# Patient Record
Sex: FEMALE | Race: BLACK OR AFRICAN AMERICAN | NOT HISPANIC OR LATINO | Employment: FULL TIME | URBAN - METROPOLITAN AREA
[De-identification: names, ages, dates, MRNs, and addresses within clinical notes are randomized per-mention and may not be internally consistent; named-entity substitution may affect disease eponyms.]

---

## 2017-09-18 PROBLEM — I51.7 LVH (LEFT VENTRICULAR HYPERTROPHY): Status: ACTIVE | Noted: 2017-09-18

## 2017-09-18 PROBLEM — G89.29 CHRONIC PAIN OF LEFT KNEE: Status: ACTIVE | Noted: 2017-09-18

## 2017-09-18 PROBLEM — M25.562 CHRONIC PAIN OF LEFT KNEE: Status: ACTIVE | Noted: 2017-09-18

## 2017-09-18 PROBLEM — I10 HTN, GOAL BELOW 140/80: Status: ACTIVE | Noted: 2017-09-18

## 2017-09-18 PROBLEM — I87.2 VENOUS STASIS DERMATITIS: Status: ACTIVE | Noted: 2017-09-18

## 2017-09-18 PROBLEM — Z12.39 BREAST CANCER SCREENING, HIGH RISK PATIENT: Status: ACTIVE | Noted: 2017-09-18

## 2017-09-18 PROBLEM — Z12.11 SCREENING FOR COLON CANCER: Status: ACTIVE | Noted: 2017-09-18

## 2017-11-01 PROBLEM — M25.562 ACUTE PAIN OF LEFT KNEE: Status: ACTIVE | Noted: 2017-11-01

## 2017-11-01 PROBLEM — E78.5 HYPERLIPIDEMIA LDL GOAL <130: Status: ACTIVE | Noted: 2017-11-01

## 2017-11-01 PROBLEM — E79.0 HYPERURICEMIA: Status: ACTIVE | Noted: 2017-11-01

## 2018-02-27 PROBLEM — M79.605 PAIN OF LEFT LOWER EXTREMITY: Status: ACTIVE | Noted: 2018-02-27

## 2018-02-27 PROBLEM — R35.0 INCREASED URINARY FREQUENCY: Status: ACTIVE | Noted: 2018-02-27

## 2018-02-27 PROBLEM — D70.9 NEUTROPENIA: Status: ACTIVE | Noted: 2018-02-27

## 2018-02-27 PROBLEM — I10 HTN, GOAL BELOW 140/90: Status: ACTIVE | Noted: 2018-02-27

## 2018-12-24 ENCOUNTER — HOSPITAL ENCOUNTER (OUTPATIENT)
Facility: HOSPITAL | Age: 53
Discharge: HOME | End: 2018-12-24
Attending: EMERGENCY MEDICINE | Admitting: EMERGENCY MEDICINE
Payer: COMMERCIAL

## 2018-12-24 VITALS
HEART RATE: 97 BPM | BODY MASS INDEX: 29.57 KG/M2 | HEIGHT: 68 IN | DIASTOLIC BLOOD PRESSURE: 98 MMHG | OXYGEN SATURATION: 95 % | RESPIRATION RATE: 19 BRPM | WEIGHT: 195.13 LBS | SYSTOLIC BLOOD PRESSURE: 170 MMHG | TEMPERATURE: 98 F

## 2018-12-24 DIAGNOSIS — J18.9 PNEUMONIA OF RIGHT UPPER LOBE DUE TO INFECTIOUS ORGANISM: ICD-10-CM

## 2018-12-24 DIAGNOSIS — I10 UNCONTROLLED HYPERTENSION: ICD-10-CM

## 2018-12-24 DIAGNOSIS — I10 HTN, GOAL BELOW 140/80: ICD-10-CM

## 2018-12-24 DIAGNOSIS — R06.02 SOB (SHORTNESS OF BREATH): ICD-10-CM

## 2018-12-24 DIAGNOSIS — J18.9 PNEUMONIA DUE TO INFECTIOUS ORGANISM, UNSPECIFIED LATERALITY, UNSPECIFIED PART OF LUNG: Primary | ICD-10-CM

## 2018-12-24 DIAGNOSIS — R79.89 ELEVATED TROPONIN: ICD-10-CM

## 2018-12-24 LAB
ALBUMIN SERPL BCP-MCNC: 4.3 G/DL
ALP SERPL-CCNC: 100 U/L
ALT SERPL W/O P-5'-P-CCNC: 28 U/L
ANION GAP SERPL CALC-SCNC: 10 MMOL/L
AST SERPL-CCNC: 22 U/L
BASOPHILS # BLD AUTO: 0.01 K/UL
BASOPHILS NFR BLD: 0.3 %
BILIRUB SERPL-MCNC: 0.8 MG/DL
BNP SERPL-MCNC: 80 PG/ML
BUN SERPL-MCNC: 14 MG/DL
CALCIUM SERPL-MCNC: 9.3 MG/DL
CHLORIDE SERPL-SCNC: 101 MMOL/L
CHOLEST SERPL-MCNC: 197 MG/DL
CHOLEST/HDLC SERPL: 4.5 {RATIO}
CO2 SERPL-SCNC: 26 MMOL/L
CREAT SERPL-MCNC: 0.9 MG/DL
DIFFERENTIAL METHOD: ABNORMAL
EOSINOPHIL # BLD AUTO: 0 K/UL
EOSINOPHIL NFR BLD: 0.9 %
ERYTHROCYTE [DISTWIDTH] IN BLOOD BY AUTOMATED COUNT: 12.8 %
EST. GFR  (AFRICAN AMERICAN): >60 ML/MIN/1.73 M^2
EST. GFR  (NON AFRICAN AMERICAN): >60 ML/MIN/1.73 M^2
GLUCOSE SERPL-MCNC: 108 MG/DL
HCT VFR BLD AUTO: 41.3 %
HDLC SERPL-MCNC: 44 MG/DL
HDLC SERPL: 22.3 %
HGB BLD-MCNC: 14 G/DL
LACTATE SERPL-SCNC: 0.8 MMOL/L
LDLC SERPL CALC-MCNC: 141 MG/DL
LYMPHOCYTES # BLD AUTO: 1.2 K/UL
LYMPHOCYTES NFR BLD: 37.4 %
MAGNESIUM SERPL-MCNC: 2.4 MG/DL
MCH RBC QN AUTO: 29.9 PG
MCHC RBC AUTO-ENTMCNC: 33.9 G/DL
MCV RBC AUTO: 88 FL
MONOCYTES # BLD AUTO: 0.3 K/UL
MONOCYTES NFR BLD: 7.9 %
NEUTROPHILS # BLD AUTO: 1.7 K/UL
NEUTROPHILS NFR BLD: 53.5 %
NONHDLC SERPL-MCNC: 153 MG/DL
PLATELET # BLD AUTO: 171 K/UL
PMV BLD AUTO: 11.4 FL
POTASSIUM SERPL-SCNC: 3.3 MMOL/L
PROT SERPL-MCNC: 8.4 G/DL
RBC # BLD AUTO: 4.68 M/UL
SODIUM SERPL-SCNC: 137 MMOL/L
TRIGL SERPL-MCNC: 60 MG/DL
TROPONIN I SERPL DL<=0.01 NG/ML-MCNC: 0.04 NG/ML
TROPONIN I SERPL DL<=0.01 NG/ML-MCNC: 0.05 NG/ML
TROPONIN I SERPL DL<=0.01 NG/ML-MCNC: 0.07 NG/ML
WBC # BLD AUTO: 3.18 K/UL

## 2018-12-24 PROCEDURE — 84484 ASSAY OF TROPONIN QUANT: CPT | Mod: 91

## 2018-12-24 PROCEDURE — G0378 HOSPITAL OBSERVATION PER HR: HCPCS

## 2018-12-24 PROCEDURE — 80061 LIPID PANEL: CPT

## 2018-12-24 PROCEDURE — 80053 COMPREHEN METABOLIC PANEL: CPT

## 2018-12-24 PROCEDURE — 94761 N-INVAS EAR/PLS OXIMETRY MLT: CPT

## 2018-12-24 PROCEDURE — 83605 ASSAY OF LACTIC ACID: CPT

## 2018-12-24 PROCEDURE — 87040 BLOOD CULTURE FOR BACTERIA: CPT

## 2018-12-24 PROCEDURE — 99285 EMERGENCY DEPT VISIT HI MDM: CPT | Mod: 25

## 2018-12-24 PROCEDURE — 83880 ASSAY OF NATRIURETIC PEPTIDE: CPT

## 2018-12-24 PROCEDURE — 83735 ASSAY OF MAGNESIUM: CPT

## 2018-12-24 PROCEDURE — 63600175 PHARM REV CODE 636 W HCPCS: Performed by: EMERGENCY MEDICINE

## 2018-12-24 PROCEDURE — 25000242 PHARM REV CODE 250 ALT 637 W/ HCPCS: Performed by: NURSE PRACTITIONER

## 2018-12-24 PROCEDURE — 94640 AIRWAY INHALATION TREATMENT: CPT

## 2018-12-24 PROCEDURE — 25000003 PHARM REV CODE 250: Performed by: NURSE PRACTITIONER

## 2018-12-24 PROCEDURE — 93010 ELECTROCARDIOGRAM REPORT: CPT | Mod: ,,, | Performed by: INTERNAL MEDICINE

## 2018-12-24 PROCEDURE — 25000242 PHARM REV CODE 250 ALT 637 W/ HCPCS: Performed by: EMERGENCY MEDICINE

## 2018-12-24 PROCEDURE — 96361 HYDRATE IV INFUSION ADD-ON: CPT

## 2018-12-24 PROCEDURE — 25000003 PHARM REV CODE 250: Performed by: EMERGENCY MEDICINE

## 2018-12-24 PROCEDURE — 93005 ELECTROCARDIOGRAM TRACING: CPT

## 2018-12-24 PROCEDURE — 85025 COMPLETE CBC W/AUTO DIFF WBC: CPT

## 2018-12-24 PROCEDURE — 96374 THER/PROPH/DIAG INJ IV PUSH: CPT

## 2018-12-24 PROCEDURE — 36415 COLL VENOUS BLD VENIPUNCTURE: CPT

## 2018-12-24 RX ORDER — OLMESARTAN MEDOXOMIL 40 MG/1
40 TABLET ORAL DAILY
Qty: 90 TABLET | Refills: 1 | Status: SHIPPED | OUTPATIENT
Start: 2018-12-25 | End: 2019-12-25

## 2018-12-24 RX ORDER — AMLODIPINE BESYLATE 10 MG/1
10 TABLET ORAL DAILY
Qty: 30 TABLET | Refills: 1 | Status: SHIPPED | OUTPATIENT
Start: 2018-12-25 | End: 2019-12-25

## 2018-12-24 RX ORDER — OLMESARTAN MEDOXOMIL 20 MG/1
40 TABLET ORAL DAILY
Status: DISCONTINUED | OUTPATIENT
Start: 2018-12-24 | End: 2018-12-25 | Stop reason: HOSPADM

## 2018-12-24 RX ORDER — POTASSIUM CHLORIDE 20 MEQ/1
40 TABLET, EXTENDED RELEASE ORAL ONCE
Status: COMPLETED | OUTPATIENT
Start: 2018-12-24 | End: 2018-12-24

## 2018-12-24 RX ORDER — NAPROXEN SODIUM 220 MG/1
162 TABLET, FILM COATED ORAL
Status: COMPLETED | OUTPATIENT
Start: 2018-12-24 | End: 2018-12-24

## 2018-12-24 RX ORDER — HYDROCHLOROTHIAZIDE 25 MG/1
25 TABLET ORAL DAILY
Qty: 30 TABLET | Refills: 1 | Status: SHIPPED | OUTPATIENT
Start: 2018-12-25 | End: 2019-12-25

## 2018-12-24 RX ORDER — HYDROCHLOROTHIAZIDE 25 MG/1
25 TABLET ORAL DAILY
Status: DISCONTINUED | OUTPATIENT
Start: 2018-12-24 | End: 2018-12-25 | Stop reason: HOSPADM

## 2018-12-24 RX ORDER — AZITHROMYCIN 250 MG/1
500 TABLET, FILM COATED ORAL
Status: COMPLETED | OUTPATIENT
Start: 2018-12-24 | End: 2018-12-24

## 2018-12-24 RX ORDER — AZITHROMYCIN 250 MG/1
250 TABLET, FILM COATED ORAL DAILY
Status: DISCONTINUED | OUTPATIENT
Start: 2018-12-25 | End: 2018-12-25 | Stop reason: HOSPADM

## 2018-12-24 RX ORDER — AZITHROMYCIN 250 MG/1
250 TABLET, FILM COATED ORAL DAILY
Qty: 4 TABLET | Refills: 0 | Status: SHIPPED | OUTPATIENT
Start: 2018-12-25 | End: 2018-12-29

## 2018-12-24 RX ORDER — HYDRALAZINE HYDROCHLORIDE 25 MG/1
25 TABLET, FILM COATED ORAL EVERY 8 HOURS PRN
Status: DISCONTINUED | OUTPATIENT
Start: 2018-12-24 | End: 2018-12-25 | Stop reason: HOSPADM

## 2018-12-24 RX ORDER — PREDNISONE 20 MG/1
40 TABLET ORAL DAILY
Qty: 10 TABLET | Refills: 0 | Status: SHIPPED | OUTPATIENT
Start: 2018-12-25 | End: 2018-12-30

## 2018-12-24 RX ORDER — AMLODIPINE BESYLATE 5 MG/1
10 TABLET ORAL DAILY
Status: DISCONTINUED | OUTPATIENT
Start: 2018-12-24 | End: 2018-12-25 | Stop reason: HOSPADM

## 2018-12-24 RX ORDER — IPRATROPIUM BROMIDE AND ALBUTEROL SULFATE 2.5; .5 MG/3ML; MG/3ML
3 SOLUTION RESPIRATORY (INHALATION)
Status: DISCONTINUED | OUTPATIENT
Start: 2018-12-24 | End: 2018-12-24

## 2018-12-24 RX ORDER — IPRATROPIUM BROMIDE AND ALBUTEROL SULFATE 2.5; .5 MG/3ML; MG/3ML
3 SOLUTION RESPIRATORY (INHALATION)
Status: DISCONTINUED | OUTPATIENT
Start: 2018-12-24 | End: 2018-12-25 | Stop reason: HOSPADM

## 2018-12-24 RX ORDER — PREDNISONE 20 MG/1
40 TABLET ORAL DAILY
Status: DISCONTINUED | OUTPATIENT
Start: 2018-12-25 | End: 2018-12-25 | Stop reason: HOSPADM

## 2018-12-24 RX ORDER — IPRATROPIUM BROMIDE AND ALBUTEROL SULFATE 2.5; .5 MG/3ML; MG/3ML
3 SOLUTION RESPIRATORY (INHALATION)
Status: COMPLETED | OUTPATIENT
Start: 2018-12-24 | End: 2018-12-24

## 2018-12-24 RX ADMIN — CEFTRIAXONE 2 G: 2 INJECTION, SOLUTION INTRAVENOUS at 12:12

## 2018-12-24 RX ADMIN — SODIUM CHLORIDE 3000 ML: 0.9 INJECTION, SOLUTION INTRAVENOUS at 12:12

## 2018-12-24 RX ADMIN — HYDROCHLOROTHIAZIDE 25 MG: 25 TABLET ORAL at 01:12

## 2018-12-24 RX ADMIN — AMLODIPINE BESYLATE 10 MG: 5 TABLET ORAL at 01:12

## 2018-12-24 RX ADMIN — OLMESARTAN MEDOXOMIL 40 MG: 20 TABLET, FILM COATED ORAL at 01:12

## 2018-12-24 RX ADMIN — IPRATROPIUM BROMIDE AND ALBUTEROL SULFATE 3 ML: .5; 3 SOLUTION RESPIRATORY (INHALATION) at 11:12

## 2018-12-24 RX ADMIN — IPRATROPIUM BROMIDE AND ALBUTEROL SULFATE 3 ML: .5; 3 SOLUTION RESPIRATORY (INHALATION) at 04:12

## 2018-12-24 RX ADMIN — ASPIRIN 81 MG 162 MG: 81 TABLET ORAL at 12:12

## 2018-12-24 RX ADMIN — AZITHROMYCIN 500 MG: 250 TABLET, FILM COATED ORAL at 12:12

## 2018-12-24 RX ADMIN — POTASSIUM CHLORIDE 40 MEQ: 1500 TABLET, EXTENDED RELEASE ORAL at 12:12

## 2018-12-24 RX ADMIN — IPRATROPIUM BROMIDE AND ALBUTEROL SULFATE 3 ML: .5; 3 SOLUTION RESPIRATORY (INHALATION) at 12:12

## 2018-12-24 NOTE — ASSESSMENT & PLAN NOTE
Continue home olmesartan/norvasc. Increase HCTZ 25 mg. Discussed with patient stop using NSAID and low salt diet. Avoid BB for now. Add hydralazine PRN.

## 2018-12-24 NOTE — PROGRESS NOTES
TN taught Symptoms and Problems for Cellulitis home care with pt and  with teach back:  1. ***, 2.***n. TN placed education sheet in [employer] Packet..     Help at home will be from Miranda bonilla, assisting in pt's recovery.     TN taught patient about things ***is responsible for when discharged:  Particularly on how to Manage his Care At Home:  1. Getting his prescriptions filled.  2. Taking his medications as directed. DO NOT MISS ANY DOSES!  3. Going to his follow-up doctor appointments.   .  TN checked whiteboard for cm/sw name, spectra link #, pt contact, and d/c disposition....Samira Marquez, RN, BSN, STN CCM

## 2018-12-24 NOTE — HPI
"Mrs. Magallanes is a 54 yo AAF with significant history uncontrolled hypertension with home  who was sent from [facility] on [facility] to hospital for evaluation of pneumonia found on chest x ray. Chest x ray reports "there is airspace disease in the lungs including right upper and left lower lungs. Small underlying lung nodules are not excluded." Reports sore throat last Monday or Tuesday then productive cough with yellow sputum and shorntess of breath stated last Wed or so. Started wheezing yesterday. Activity and laying flat worsen symptoms. Denies fever but reports chills started yesterday.  also sick with URI symptoms. Denies smoking or illicit drug use. Reports diagnosed with asthma 4 years ago and had an inhaler but have not used inhaler in a long time. Use Advil very often for headaches and generalized aches and pain.     No EKG in epic (will look in folder), Troponin 0.066.   "

## 2018-12-24 NOTE — ASSESSMENT & PLAN NOTE
Likely ischemic demand from Pneumonia and uncontrolled HTN. No previous 2 D echo. Trend troponin and 2 D echo. Obtain lipid panel.  Continue ASA 81mg

## 2018-12-24 NOTE — ASSESSMENT & PLAN NOTE
" Chest x ray reports "there is airspace disease in the lungs including right upper and left lower lungs. Small underlying lung nodules are not excluded."   Received duoneb x 1 and solumedrom 125 mg x 1 at [facility] prior to admission  In ED, received rocephin IV and azithromycin 500 mg PO  Continue azithromycin PO and duoneb q 4 hrs. Continue steroid. Will need repeat CXR in 4-6 weeks.   "

## 2018-12-24 NOTE — ED PROVIDER NOTES
Encounter Date: 12/24/2018    SCRIBE #1 NOTE: I, Re Solomon, am scribing for, and in the presence of,  Arya Bray MD. I have scribed the following portions of the note - Other sections scribed: HPI, ROS, PE.       History     Chief Complaint   Patient presents with    Shortness of Breath     reports being sent from urgent care that started last week, but worsened last night.      CC: Wheezing    HPI: This is a 53 y.o. F who has HTN who presents to the ED for emergent evaluation of acute wheezing with associated cough that began last night. Pt states that wheezing and cough is exacerbated with lying flat. She was unable to lie flat last night and states that she slept sitting up. Pt reports a previous episode of wheezing, in which she was treated with breathing treatment years ago. Pt was evaluated and treated at Urgent Care for current episode of wheezing and coughing today. She was administered steroids and a breathing treatment at that visit with improvement. Pt was instructed to report to the ED from Urgent Care due to being diagnosed with Pneumonia. She states that she was also informed that she has an enlarged heart at that visit. Pt denies fever, chills, CP, night sweats, weight loss, leg swelling, abdominal pain, nausea, vomiting, diarrhea, difficulty urinating, constipation, Hx of CHF, tobacco use, or recreational drug use.        The history is provided by the patient. No  was used.     Review of patient's allergies indicates:  No Known Allergies  Past Medical History:   Diagnosis Date    Hypertension      Past Surgical History:   Procedure Laterality Date    APPENDECTOMY      HYSTERECTOMY       History reviewed. No pertinent family history.  Social History     Tobacco Use    Smoking status: Never Smoker   Substance Use Topics    Alcohol use: Yes    Drug use: No     Review of Systems   Constitutional: Negative for chills, diaphoresis, fever and unexpected weight change.    HENT: Negative for ear pain and sore throat.    Eyes: Negative for pain.   Respiratory: Positive for cough and wheezing. Negative for shortness of breath.    Cardiovascular: Negative for chest pain and leg swelling.   Gastrointestinal: Negative for abdominal pain, constipation, diarrhea, nausea and vomiting.   Genitourinary: Negative for difficulty urinating and dysuria.   Musculoskeletal: Negative for back pain.   Skin: Negative for rash.   Neurological: Negative for headaches.       Physical Exam     Initial Vitals [12/24/18 0943]   BP Pulse Resp Temp SpO2   (!) 177/111 97 18 98.9 °F (37.2 °C) 97 %      MAP       --         Physical Exam    Nursing note and vitals reviewed.    GENERAL: well developed, well nourished, lying comfortably, no acute distress, non toxic appearing  SKIN: warm, moist, no rash, no ecchymosis  HEAD: normocephalic, atraumtic  EYES: conjunctiva w/o pallor, sclera anicteric & w/o injection, PERRL, EOMI  MOUTH: patent, moist mucous membranes.  NECK: supple, activeROM w/o pain or stiffness, no JVD, no LAD  RESPIRATORY: effort nonlabored, no accessory muscule use or retractions. no rales/rhonci/. Bilateral wheezes at the lower bases, left greater than right. no chest wall TTP.  CARDIOVASCULAR: RRR, S1/S2, no murmur, gallop or rub. radial pulses 2+ symmetric bilaterally  ABDOMEN: soft, non tender, non distended.  MUSCULOSKELETAL: good activeROM of all extremities. no lower extremity edema or cyanosis  NEURO: A&Ox4, normal speech, normal gait, CNII-XII intact.       ED Course   Procedures  Labs Reviewed   CBC W/ AUTO DIFFERENTIAL - Abnormal; Notable for the following components:       Result Value    WBC 3.18 (*)     Gran # (ANC) 1.7 (*)     All other components within normal limits   COMPREHENSIVE METABOLIC PANEL - Abnormal; Notable for the following components:    Potassium 3.3 (*)     All other components within normal limits   TROPONIN I - Abnormal; Notable for the following components:     Troponin I 0.066 (*)     All other components within normal limits   CULTURE, BLOOD   CULTURE, BLOOD   B-TYPE NATRIURETIC PEPTIDE   MAGNESIUM   LACTIC ACID, PLASMA     EKG Readings: (Independently Interpreted)   EKG done at 9:47 a.m. showing normal sinus rhythm the rate of 97.  Biatrial enlargement LVH.  Flat T-waves diffusely.  No ST elevation.  Normal axis.  Nonspecific EKG.       Imaging Results    None          Medical Decision Making:   Initial Assessment:   Patient presenting today secondary to shortness of breath.  Patient has wheezing bilaterally which is consistent with a the reactive airways disease.  Remote history of asthma.  Given breathing treatments here.  Chest x-ray was done the outside facility which concerning for pneumonia.  I did review the chest x-ray and it does not a developing pneumonia.  Patient does not appear to be septic.  White count is at her baseline.  Did add on a 30 milliliter/kilogram bolus, lactic acid, abx, and blood cultures for completeness.  Additionally her labs were notable for an elevated troponin which likely a stress response from having a pneumonia.  However will trend the troponins and patient will be admitted to the hospital service for further management.  Port score 43    Also considered but less likely:     PE: normal rate, no ecent immobilization/surgery/travel/family history.  Other diagnosis more likely  Tamponade: unlikely due to chest xray and ekg  STEMI: No STEMI on ekg  Dissection: equal pulses bilaterally and no ripping chest pain to the back  Esophageal rupture: no dysphagia or vomiting and chest xray negative for mediastinal air  Arrhythmia: no arrhythmia on ekg  CHF: no fluid overload on Cxr and physical exam  Pneumothorax: bilateral breath sounds and no signs of pneumothorax on chest xray     Patient is agreeable for admission.  Admitted to the hospitalist service.  Basic admission orders as a courtesy were done.    Clinical Tests:   Lab Tests: Ordered  and Reviewed  Radiological Study: Ordered and Reviewed  Medical Tests: Ordered and Reviewed  ED Management:                Scribe Attestation:   Scribe #1: I performed the above scribed service and the documentation accurately describes the services I performed. I attest to the accuracy of the note.    Attending Attestation:           Physician Attestation for Scribe:  Physician Attestation Statement for Scribe #1: I, Arya Bray MD, reviewed documentation, as scribed by Re Solomon in my presence, and it is both accurate and complete.                    Clinical Impression:   The primary encounter diagnosis was Pneumonia due to infectious organism, unspecified laterality, unspecified part of lung. Diagnoses of SOB (shortness of breath) and Elevated troponin were also pertinent to this visit.                             Arya Bray MD  12/24/18 5103

## 2018-12-24 NOTE — H&P
"[facility] Hospital Medicine  History & Physical    Patient Name: Atiya Magallanes  MRN: [id number]  Admission Date: 12/24/2018  Attending Physician: Li Benavidez MD   Primary Care Provider: Muriel Murray MD         Patient information was obtained from patient and ER records.     Subjective:     Principal Problem:Pneumonia    Chief Complaint:   Chief Complaint   Patient presents with    Shortness of Breath     reports being sent from urgent care that started last week, but worsened last night.         HPI: Mrs. Magallanes is a 52 yo AAF with significant history uncontrolled hypertension with home  who was sent from [facility] to hospital for evaluation of pneumonia found on chest x ray. Chest x ray reports "there is airspace disease in the lungs including right upper and left lower lungs. Small underlying lung nodules are not excluded." Reports sore throat last Monday or Tuesday then productive cough with yellow sputum and shorntess of breath stated last Wed or so. Started wheezing yesterday. Activity and laying flat worsen symptoms. Denies fever but reports chills started yesterday.  also sick with URI symptoms. Denies smoking or illicit drug use. Reports diagnosed with asthma 4 years ago and had an inhaler but have not used inhaler in a long time. Use Advil very often for headaches and generalized aches and pain.     No EKG in epic (will look in folder), Troponin 0.066.     Past Medical History:   Diagnosis Date    Hypertension        Past Surgical History:   Procedure Laterality Date    APPENDECTOMY      HYSTERECTOMY         Review of patient's allergies indicates:  No Known Allergies    No current facility-administered medications on file prior to encounter.      Current Outpatient Medications on File Prior to Encounter   Medication Sig    olmesartan-amLODIPin-hcthiazid 40-10-12.5 mg Tab TAKE ONE TABLET BY MOUTH EVERY DAY    [DISCONTINUED] " allopurinol (ZYLOPRIM) 100 MG tablet TAKE 2 TABLETS(200 MG) BY MOUTH EVERY DAY    [DISCONTINUED] ibuprofen (ADVIL,MOTRIN) 100 MG tablet Take 200 mg by mouth every 6 (six) hours as needed for Temperature greater than.    comp stocking,knee,regular,sml (T.E.D. ANTI-EMBOLISM STOCKING) Misc 2 Pair by Misc.(Non-Drug; Combo Route) route once daily.     Family History     None        Tobacco Use    Smoking status: Never Smoker   Substance and Sexual Activity    Alcohol use: Yes    Drug use: No    Sexual activity: Not on file     Review of Systems   Constitutional: Positive for activity change and chills. Negative for fatigue and fever.   HENT: Negative.    Eyes: Negative.    Respiratory: Positive for shortness of breath and wheezing.    Cardiovascular: Negative.    Gastrointestinal: Negative.    Endocrine: Negative.    Genitourinary: Negative.    Musculoskeletal: Positive for arthralgias.   Allergic/Immunologic: Negative.    Neurological: Negative.    Hematological: Negative.    Psychiatric/Behavioral: Negative.      Objective:     Vital Signs (Most Recent):  Temp: 97.6 °F (36.4 °C) (12/24/18 1227)  Pulse: (!) 111 (12/24/18 1236)  Resp: 18 (12/24/18 1236)  BP: (!) 172/98 (12/24/18 1236)  SpO2: (!) 93 % (12/24/18 1236) Vital Signs (24h Range):  Temp:  [97.6 °F (36.4 °C)-98.9 °F (37.2 °C)] 97.6 °F (36.4 °C)  Pulse:  [] 111  Resp:  [18-20] 18  SpO2:  [93 %-97 %] 93 %  BP: (170-182)/() 172/98     Weight: 90.7 kg (200 lb)  Body mass index is 30.41 kg/m².    Physical Exam   Constitutional: She is oriented to person, place, and time. She appears well-developed and well-nourished.   Eyes: EOM are normal.   Neck: Neck supple.   Cardiovascular: Normal rate.   Murmur heard.  Pulmonary/Chest: Effort normal and breath sounds normal. No respiratory distress.   Abdominal: Soft. Bowel sounds are normal. She exhibits no distension.   Musculoskeletal: Normal range of motion.   Neurological: She is alert and oriented to  "person, place, and time.   Skin: Skin is warm and dry.   Psychiatric: She has a normal mood and affect.         CRANIAL NERVES     CN III, IV, VI   Extraocular motions are normal.        Significant Labs: All pertinent labs within the past 24 hours have been reviewed.    Significant Imaging: I have reviewed and interpreted all pertinent imaging results/findings within the past 24 hours.    Assessment/Plan:     * Pneumonia     Chest x ray reports "there is airspace disease in the lungs including right upper and left lower lungs. Small underlying lung nodules are not excluded."   Received duoneb x 1 and solumedrom 125 mg x 1 at [facility] prior to admission  In ED, received rocephin IV and azithromycin 500 mg PO  Continue azithromycin PO and duoneb q 4 hrs. Continue steroid. Will need repeat CXR in 4-6 weeks.      Elevated troponin    Likely ischemic demand from Pneumonia and uncontrolled HTN. No previous 2 D echo. Trend troponin and 2 D echo. Obtain lipid panel.  Continue ASA 81mg       Uncontrolled hypertension    Continue home olmesartan/norvasc. Increase HCTZ 25 mg. Discussed with patient stop using NSAID and low salt diet. Avoid BB for now. Add hydralazine PRN.          VTE Risk Mitigation (From admission, onward)        Ordered     IP VTE HIGH RISK PATIENT  Once      12/24/18 1310     Place sequential compression device  Until discontinued      12/24/18 1310     Place CHELA hose  Until discontinued      12/24/18 1310             Shauna Patrick NP  Department of Hospital Medicine   [facility]  "

## 2018-12-24 NOTE — NURSING
Pt arrive to the unit by wheelchair accompanied by ED staff.  Assisted pt to bed. Tele monitoring initiated.  Admit assessment initiated.  Pt is AAOx4.  No distress noted.

## 2018-12-24 NOTE — LETTER
December 24, 2018         [place] [address]  Phone: [phone]  Fax: [phone]       Patient: Atiya Magallanes   YOB: 1965  Date of Visit: 12/24/2018    To Whom It May Concern:    Margaret Magallanes  was at [facility] on 12/24/2018. She may return to work/school on 1/31/2018 no restrictions. If you have any questions or concerns, or if I can be of further assistance, please do not hesitate to contact me.    Sincerely,      Bonilla Elizondo PA-C

## 2018-12-24 NOTE — SUBJECTIVE & OBJECTIVE
Past Medical History:   Diagnosis Date    Hypertension        Past Surgical History:   Procedure Laterality Date    APPENDECTOMY      HYSTERECTOMY         Review of patient's allergies indicates:  No Known Allergies    No current facility-administered medications on file prior to encounter.      Current Outpatient Medications on File Prior to Encounter   Medication Sig    olmesartan-amLODIPin-hcthiazid 40-10-12.5 mg Tab TAKE ONE TABLET BY MOUTH EVERY DAY    [DISCONTINUED] allopurinol (ZYLOPRIM) 100 MG tablet TAKE 2 TABLETS(200 MG) BY MOUTH EVERY DAY    [DISCONTINUED] ibuprofen (ADVIL,MOTRIN) 100 MG tablet Take 200 mg by mouth every 6 (six) hours as needed for Temperature greater than.    comp stocking,knee,regular,sml (T.E.D. ANTI-EMBOLISM STOCKING) Misc 2 Pair by Misc.(Non-Drug; Combo Route) route once daily.     Family History     None        Tobacco Use    Smoking status: Never Smoker   Substance and Sexual Activity    Alcohol use: Yes    Drug use: No    Sexual activity: Not on file     Review of Systems   Constitutional: Positive for activity change and chills. Negative for fatigue and fever.   HENT: Negative.    Eyes: Negative.    Respiratory: Positive for shortness of breath and wheezing.    Cardiovascular: Negative.    Gastrointestinal: Negative.    Endocrine: Negative.    Genitourinary: Negative.    Musculoskeletal: Positive for arthralgias.   Allergic/Immunologic: Negative.    Neurological: Negative.    Hematological: Negative.    Psychiatric/Behavioral: Negative.      Objective:     Vital Signs (Most Recent):  Temp: 97.6 °F (36.4 °C) (12/24/18 1227)  Pulse: (!) 111 (12/24/18 1236)  Resp: 18 (12/24/18 1236)  BP: (!) 172/98 (12/24/18 1236)  SpO2: (!) 93 % (12/24/18 1236) Vital Signs (24h Range):  Temp:  [97.6 °F (36.4 °C)-98.9 °F (37.2 °C)] 97.6 °F (36.4 °C)  Pulse:  [] 111  Resp:  [18-20] 18  SpO2:  [93 %-97 %] 93 %  BP: (170-182)/() 172/98     Weight: 90.7 kg (200 lb)  Body mass  index is 30.41 kg/m².    Physical Exam   Constitutional: She is oriented to person, place, and time. She appears well-developed and well-nourished.   Eyes: EOM are normal.   Neck: Neck supple.   Cardiovascular: Normal rate.   Murmur heard.  Pulmonary/Chest: Effort normal and breath sounds normal. No respiratory distress.   Abdominal: Soft. Bowel sounds are normal. She exhibits no distension.   Musculoskeletal: Normal range of motion.   Neurological: She is alert and oriented to person, place, and time.   Skin: Skin is warm and dry.   Psychiatric: She has a normal mood and affect.         CRANIAL NERVES     CN III, IV, VI   Extraocular motions are normal.        Significant Labs: All pertinent labs within the past 24 hours have been reviewed.    Significant Imaging: I have reviewed and interpreted all pertinent imaging results/findings within the past 24 hours.

## 2018-12-25 LAB
AORTIC ROOT ANNULUS: 3.04 CM
AORTIC VALVE CUSP SEPERATION: 2.22 CM
ASCENDING AORTA: 2.74 CM
AV INDEX (PROSTH): 0.66
AV MEAN GRADIENT: 10.78 MMHG
AV PEAK GRADIENT: 17.81 MMHG
AV VALVE AREA: 2.34 CM2
BSA FOR ECHO PROCEDURE: 2.06 M2
CV ECHO LV RWT: 0.62 CM
DOP CALC AO PEAK VEL: 2.11 M/S
DOP CALC AO VTI: 37.51 CM
DOP CALC LVOT AREA: 3.53 CM2
DOP CALC LVOT DIAMETER: 2.12 CM
DOP CALC LVOT STROKE VOLUME: 87.74 CM3
DOP CALCLVOT PEAK VEL VTI: 24.87 CM
E WAVE DECELERATION TIME: 135.97 MSEC
E/A RATIO: 0.72
E/E' RATIO: 15
ECHO LV POSTERIOR WALL: 1.39 CM (ref 0.6–1.1)
FRACTIONAL SHORTENING: 34 % (ref 28–44)
INTERVENTRICULAR SEPTUM: 1.51 CM (ref 0.6–1.1)
IVRT: 0.06 MSEC
LA MAJOR: 6.19 CM
LA MINOR: 5.81 CM
LA WIDTH: 4.81 CM
LEFT ATRIUM SIZE: 4.83 CM
LEFT ATRIUM VOLUME INDEX: 58.5 ML/M2
LEFT ATRIUM VOLUME: 118.37 CM3
LEFT INTERNAL DIMENSION IN SYSTOLE: 2.97 CM (ref 2.1–4)
LEFT VENTRICLE DIASTOLIC VOLUME INDEX: 45.96 ML/M2
LEFT VENTRICLE DIASTOLIC VOLUME: 92.96 ML
LEFT VENTRICLE MASS INDEX: 129.9 G/M2
LEFT VENTRICLE SYSTOLIC VOLUME INDEX: 16.9 ML/M2
LEFT VENTRICLE SYSTOLIC VOLUME: 34.27 ML
LEFT VENTRICULAR INTERNAL DIMENSION IN DIASTOLE: 4.51 CM (ref 3.5–6)
LEFT VENTRICULAR MASS: 262.79 G
LV LATERAL E/E' RATIO: 12.5
LV SEPTAL E/E' RATIO: 18.75
MV PEAK A VEL: 1.04 M/S
MV PEAK E VEL: 0.75 M/S
PISA TR MAX VEL: 2.87 M/S
PULM VEIN S/D RATIO: 1.11
PV PEAK D VEL: 0.44 M/S
PV PEAK S VEL: 0.49 M/S
PV PEAK VELOCITY: 1.55 CM/S
RA MAJOR: 5.57 CM
RA PRESSURE: 3 MMHG
RA WIDTH: 4.67 CM
RIGHT VENTRICULAR END-DIASTOLIC DIMENSION: 3.22 CM
RV TISSUE DOPPLER FREE WALL SYSTOLIC VELOCITY 1 (APICAL 4 CHAMBER VIEW): 13.62 M/S
SINUS: 2.85 CM
STJ: 2.26 CM
TDI LATERAL: 0.06
TDI SEPTAL: 0.04
TDI: 0.05
TR MAX PG: 32.95 MMHG
TRICUSPID ANNULAR PLANE SYSTOLIC EXCURSION: 2.38 CM
TV REST PULMONARY ARTERY PRESSURE: 36 MMHG

## 2018-12-25 NOTE — NURSING
Pt discharged. PIV removed catheter intact and tele monitor discontinued. No swelling or drainage at iv insertion site. Discharge orders reviewed with pt  and pt verbalized understanding. Copy placed in blue folder and blue folder given to pt. Pt's belongings removed from room and given to pt. Pt appears to be in no apparent distress and denies any discomfort. Pt assisted off unit via wheelchair by transport.

## 2018-12-25 NOTE — NURSING
Discharge orders received. Patient to discharge home if next troponin at 2200 trends down. Patient updated with plan of care.

## 2018-12-26 NOTE — DISCHARGE SUMMARY
"[facility] Hospital Medicine  Discharge Summary      Patient Name: Atiya Magallanes  MRN: [id number]  Admission Date: 12/24/2018  Hospital Length of Stay: 0 days  Discharge Date and Time: 12/24/18  Attending Physician: No att. providers found   Discharging Provider: Shauna Patrick NP  Primary Care Provider: Muriel Murray MD      HPI:   Mrs. Magallanes is a 54 yo AAF with significant history uncontrolled hypertension with home  who was sent from [facility] to hospital for evaluation of pneumonia found on chest x ray. Chest x ray reports "there is airspace disease in the lungs including right upper and left lower lungs. Small underlying lung nodules are not excluded." Reports sore throat last Monday or Tuesday then productive cough with yellow sputum and shorntess of breath stated last Wed or so. Started wheezing yesterday. Activity and laying flat worsen symptoms. Denies fever but reports chills started yesterday.  also sick with URI symptoms. Denies smoking or illicit drug use. Reports diagnosed with asthma 4 years ago and had an inhaler but have not used inhaler in a long time. Use Advil very often for headaches and generalized aches and pain.     No EKG in epic (will look in folder), Troponin 0.066.     * No surgery found *      Hospital Course:   Patient admitted for community acquired pneumonia and elevated troponin. Chest x ray reports "there is airspace disease in the lungs including right upper and left lower lungs. Small underlying lung nodules are not excluded."  Minimal elevation due to demand ischemia from pneumonia and uncontrolled blood pressure. Symptoms improved with start of antibiotics, steroid and duoneb. Blood pressure also improve with resuming home antihypertensives. Patient wants to go home Vern mg and did not want to wait for 2 D echo result. Patient stable for discharge. I will call patient with 2 D echo result.  " Continue azithromycin PO and steroid. Encourage patient compliance with antihypertensives. Follow up with PCP and will need repeat CXR in 4-6 weeks.       Consults:     No new Assessment & Plan notes have been filed under this hospital service since the last note was generated.  Service: Hospital Medicine    Final Active Diagnoses:    Diagnosis Date Noted POA    PRINCIPAL PROBLEM:  Pneumonia [J18.9] 12/24/2018 Yes    Elevated troponin [R74.8] 12/24/2018 Yes    Uncontrolled hypertension [I10]  Yes      Problems Resolved During this Admission:       Discharged Condition: good    Disposition: Home or Self Care    Follow Up:  Follow-up Information     Muriel Murray MD. Schedule an appointment as soon as possible for a visit in 3 days.    Specialty:  Internal Medicine  Contact information:  [address] [place] [address]  [address]  [place] [id number] [phone]                 Patient Instructions:      Notify your health care provider if you experience any of the following:  difficulty breathing or increased cough     Activity as tolerated         Pending Diagnostic Studies:     None         Medications:  Reconciled Home Medications:      Medication List      START taking these medications    amLODIPine 10 MG tablet  Commonly known as:  NORVASC  Take 1 tablet (10 mg total) by mouth once daily.     azithromycin 250 MG tablet  Commonly known as:  Z-HAYDER  Take 1 tablet (250 mg total) by mouth once daily. for 4 days     hydroCHLOROthiazide 25 MG tablet  Commonly known as:  HYDRODIURIL  Take 1 tablet (25 mg total) by mouth once daily.     olmesartan 40 MG tablet  Commonly known as:  BENICAR  Take 1 tablet (40 mg total) by mouth once daily.     predniSONE 20 MG tablet  Commonly known as:  DELTASONE  Take 2 tablets (40 mg total) by mouth once daily. for 5 days        CONTINUE taking these medications    jayden.stocking,knee,reg,smal Misc  Commonly known as:  T.E.D. ANTI-EMBOLISM STOCKING  2 Pair by Misc.(Non-Drug; Combo Route)  route once daily.        STOP taking these medications    allopurinol 100 MG tablet  Commonly known as:  ZYLOPRIM     ibuprofen 100 MG tablet  Commonly known as:  ADVIL,MOTRIN     olmesartan-amLODIPin-hcthiazid 40-10-12.5 mg Tab            Indwelling Lines/Drains at time of discharge:   Lines/Drains/Airways          None          Time spent on the discharge of patient: 30 minutes  Patient was seen and examined on the date of discharge and determined to be suitable for discharge.         Shauna Patrick NP  Department of Hospital Medicine  [facility]

## 2018-12-26 NOTE — HOSPITAL COURSE
"Patient admitted for community acquired pneumonia and elevated troponin. Chest x ray reports "there is airspace disease in the lungs including right upper and left lower lungs. Small underlying lung nodules are not excluded."  Minimal elevation due to demand ischemia from pneumonia and uncontrolled blood pressure. Symptoms improved with start of antibiotics, steroid and duoneb. Blood pressure also improve with resuming home antihypertensives. Patient wants to go home [facility] har[facility]a and did not want to wait for 2 D echo result. Patient stable for discharge. I will call patient with 2 D echo result.  Continue azithromycin PO and steroid. Encourage patient compliance with antihypertensives. Follow up with PCP and will need repeat CXR in 4-6 weeks.   "

## 2018-12-29 LAB
BACTERIA BLD CULT: NORMAL
BACTERIA BLD CULT: NORMAL

## 2019-06-05 DIAGNOSIS — Z12.39 SCREENING BREAST EXAMINATION: Primary | ICD-10-CM

## 2024-06-21 ENCOUNTER — HOSPITAL ENCOUNTER (EMERGENCY)
Facility: HOSPITAL | Age: 59
Discharge: HOME | End: 2024-06-21
Attending: STUDENT IN AN ORGANIZED HEALTH CARE EDUCATION/TRAINING PROGRAM

## 2024-06-21 VITALS
BODY MASS INDEX: 30.13 KG/M2 | SYSTOLIC BLOOD PRESSURE: 148 MMHG | TEMPERATURE: 99 F | RESPIRATION RATE: 18 BRPM | HEART RATE: 66 BPM | HEIGHT: 67 IN | WEIGHT: 192 LBS | OXYGEN SATURATION: 98 % | DIASTOLIC BLOOD PRESSURE: 100 MMHG
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
## 2024-06-21 DIAGNOSIS — S09.90XA INJURY OF HEAD, INITIAL ENCOUNTER: ICD-10-CM

## 2024-06-21 DIAGNOSIS — S00.81XA FACIAL ABRASION, INITIAL ENCOUNTER: Primary | ICD-10-CM

## 2024-06-21 PROCEDURE — 90471 IMMUNIZATION ADMIN: CPT

## 2024-06-21 PROCEDURE — 90715 TDAP VACCINE 7 YRS/> IM: CPT

## 2024-06-21 PROCEDURE — 99285 EMERGENCY DEPT VISIT HI MDM: CPT | Mod: 25

## 2024-06-21 PROCEDURE — 63600175 PHARM REV CODE 636 W HCPCS

## 2024-06-21 PROCEDURE — 25000003 PHARM REV CODE 250

## 2024-06-21 RX ORDER — METHOCARBAMOL 500 MG/1
1000 TABLET, FILM COATED ORAL 3 TIMES DAILY
Qty: 30 TABLET | Refills: 0 | Status: SHIPPED | OUTPATIENT
Start: 2024-06-21 | End: 2024-06-26

## 2024-06-21 RX ORDER — MUPIROCIN 20 MG/G
1 OINTMENT TOPICAL
Status: COMPLETED | OUTPATIENT
Start: 2024-06-21 | End: 2024-06-21

## 2024-06-21 RX ORDER — ACETAMINOPHEN 500 MG
500 TABLET ORAL EVERY 6 HOURS PRN
Qty: 30 TABLET | Refills: 0 | Status: SHIPPED | OUTPATIENT
Start: 2024-06-21

## 2024-06-21 RX ADMIN — TETANUS TOXOID, REDUCED DIPHTHERIA TOXOID AND ACELLULAR PERTUSSIS VACCINE, ADSORBED 0.5 ML: 5; 2.5; 8; 8; 2.5 SUSPENSION INTRAMUSCULAR at 07:06

## 2024-06-21 RX ADMIN — MUPIROCIN 1 TUBE: 20 OINTMENT TOPICAL at 07:06

## 2024-06-22 ENCOUNTER — OFFICE VISIT (OUTPATIENT)
Dept: URGENT CARE | Facility: CLINIC | Age: 59
End: 2024-06-22
Payer: OTHER GOVERNMENT

## 2024-06-22 VITALS
HEIGHT: 67 IN | OXYGEN SATURATION: 95 % | DIASTOLIC BLOOD PRESSURE: 71 MMHG | WEIGHT: 192 LBS | SYSTOLIC BLOOD PRESSURE: 113 MMHG | BODY MASS INDEX: 30.13 KG/M2 | RESPIRATION RATE: 16 BRPM | TEMPERATURE: 99 F | HEART RATE: 71 BPM

## 2024-06-22 DIAGNOSIS — S43.401A SPRAIN OF RIGHT SHOULDER, UNSPECIFIED SHOULDER SPRAIN TYPE, INITIAL ENCOUNTER: ICD-10-CM

## 2024-06-22 DIAGNOSIS — S83.91XA SPRAIN OF RIGHT KNEE, UNSPECIFIED LIGAMENT, INITIAL ENCOUNTER: ICD-10-CM
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
## 2024-06-22 PROCEDURE — 73030 X-RAY EXAM OF SHOULDER: CPT | Mod: RT,S$GLB,, | Performed by: RADIOLOGY

## 2024-06-22 PROCEDURE — 73562 X-RAY EXAM OF KNEE 3: CPT | Mod: RT,S$GLB,, | Performed by: RADIOLOGY

## 2024-06-22 PROCEDURE — 99203 OFFICE O/P NEW LOW 30 MIN: CPT | Mod: S$GLB,,,

## 2024-06-22 NOTE — PATIENT INSTRUCTIONS
Please drink plenty of fluids.  Please get plenty of rest.    Please return here or go to the Emergency Department for any concerns or worsening of condition.    Please take IBUPROFEN (up to 800 mg) every 8 hours as needed for pain/inflammation, you may decrease to every 12 hour, or once daily, or discontinue as your symptoms improve.     Tylenol:  Regular strength can take 650 mg every 4-6 hours as needed, do not exceed 3,250 mg within a day.  Extra strength can take 1,000 mg every 6 hours as needed, do not exceed 3,000 mg in one day.    Rest, ice, compression and elevation to the affected joint or limb as needed.  Please follow up with your primary care doctor or specialist as needed.    If you  smoke, please stop smoking.

## 2024-06-22 NOTE — LETTER
[facility]  [address], [address]  [place] [address]  Phone: [phone]  Fax: [phone]  [facility]: [phone]    Pt Name: Atiya Magallanes  Injury Date: 06/21/2024   Employee ID:  Date of First Treatment: 06/22/2024   Company: [employer] to record [employer]      Appointment Time: 01:15 PM Arrived: 13:36   Provider: Johnny Baxter PA-C Time Out:14:57     Office Treatment:   1. Fall, initial encounter    2. Sprain of right shoulder, unspecified shoulder sprain type, initial encounter    3. Sprain of right knee, unspecified ligament, initial encounter                     Return Appointment: [facility] on Monday 06/24/2024

## 2024-06-22 NOTE — DISCHARGE INSTRUCTIONS
Thank you for coming to our Emergency Department today. It is important to remember that some problems or medical conditions are difficult to diagnose and may not be found or addressed during your Emergency Department visit.  These conditions often start with non-specific symptoms and can only be diagnosed on follow up visits with your primary care physician or specialist when the symptoms continue or change. Please remember that all medical conditions can change, and we cannot predict how you will be feeling tomorrow or the next day. Return to the ER with any questions/concerns, new/concerning symptoms including fever, chest pain, shortness of breath, loss of consciousness, dizziness, weakness, worsening symptoms, failure to improve, or any other concerns. Also, please follow up with your Primary Care Physician and/or Pediatrician in the next 1-2 days to review your ED visit in entirety and for re-evaluation.   Be sure to follow up with your primary care doctor and review all labs/imaging/tests that were performed during your ER visit with them. It is very common for us to identify non-emergent incidental findings which must be followed up with your primary care physician.  Some labs/imaging/tests may be outside of the normal range, and require non-emergent follow-up and/or further investigation/treatment/procedures/testing to help diagnose/exclude/prevent complications or other potentially serious medical conditions. Some abnormalities may not have been discussed or addressed during your ER visit. Some lab results may not return during your ER visit but can be accessible by downloading the free [facility] or by visiting [email or web address] . It is important for you to review all labs/imaging/tests which are outside of the normal range with your physician.  An ER visit does not replace a primary care visit, and many screening tests or follow-up tests cannot be ordered by an ER doctor or performed by  the ER. Some tests may even require pre-approval.  If you do not have a primary care doctor, you may contact the one listed on your discharge paperwork or you may also call the [facility] Appointment [facility]k at [phone] , or [address] [place] [address] at  [phone] to schedule an appointment, or establish care with a primary care doctor or even a specialist and to obtain information about local resources. It is important to your health that you have a primary care doctor.  Please take all medications as directed. We have done our best to select a medication for you that will treat your condition however, all medications may potentially have side-effects and it is impossible to predict which medications may give you side-effects or what those side-effects (if any) those medications may give you.  If you feel that you are having a negative effect or side-effect of any medication you should stop taking those medications immediately and seek medical attention. If you feel that you are having a life-threatening reaction call 911.  Do not drive, swim, climb to height, take a bath, operate heavy machinery, drink alcohol or take potentially sedating medications, sign any legal documents or make any important decisions for 24 hours if you have received any pain medications, sedatives or mood altering drugs during your ER visit or within 24 hours of taking them if they have been prescribed to you.   You can find additional resources for Dentists, hearing aids, durable medical equipment, low cost pharmacies and other resources at [email or web address]  Patient agrees with this plan. Discussed with her strict return precautions, they verbalized understanding. Patient is stable for discharge.   § Please take all medication as prescribed.

## 2024-06-22 NOTE — ED PROVIDER NOTES
Encounter Date: 6/21/2024       History     Chief Complaint   Patient presents with    Fall     Pt to ED for evaluation and tx s/p a trip and fall that occurred around 1712 today. Pt denies LOC, or use of blood thinners. Pt has an abrasion to the right side of her face. No dizziness reported. No meds PTA.     The history is provided by the patient and medical records.   58-year-old female with past medical history of hypertension presenting to the emergency department today complaining of right-sided face pain after a trip and fall.  Patient states she tripped and fell landing on the concrete causing an abrasion to the right side of her face.  Denies loss of consciousness, nausea vomiting, change in vision or weakness.  No medication taken prior to arrival.  Denies any fever, chest pain, shortness for breath, headache, dizziness or other associated symptoms.  Review of patient's allergies indicates:  No Known Allergies  Past Medical History:   Diagnosis Date    Hypertension      Past Surgical History:   Procedure Laterality Date    APPENDECTOMY      HYSTERECTOMY       No family history on file.  Social History     Tobacco Use    Smoking status: Never   Substance Use Topics    Alcohol use: Yes    Drug use: No     Review of Systems   Constitutional:  Negative for chills, diaphoresis, fatigue and fever.   HENT:  Negative for congestion, rhinorrhea and sore throat.    Eyes:  Negative for redness and visual disturbance.   Respiratory:  Negative for cough and shortness of breath.    Cardiovascular:  Negative for chest pain, palpitations and leg swelling.   Gastrointestinal:  Negative for abdominal pain, diarrhea, nausea and vomiting.   Genitourinary:  Negative for difficulty urinating, dysuria, flank pain and frequency.   Musculoskeletal:  Positive for myalgias (Right side of face). Negative for arthralgias, back pain, neck pain and neck stiffness.   Skin:  Positive for wound (Abrasions to the right zygomatic region and  right eyebrow). Negative for rash.   Neurological:  Negative for dizziness, weakness, light-headedness and headaches.   Hematological:  Does not bruise/bleed easily.       Physical Exam     Initial Vitals [06/21/24 1850]   BP Pulse Resp Temp SpO2   134/85 80 18 98.7 °F (37.1 °C) 97 %      MAP       --         Physical Exam    Nursing note and vitals reviewed.  Constitutional: She appears well-developed and well-nourished. She is not diaphoretic. No distress.   HENT:   Head: Normocephalic. Head is with abrasion. Head is without raccoon's eyes, without Cole's sign, without contusion and without laceration.       Right Ear: Tympanic membrane, external ear and ear canal normal.   Left Ear: Tympanic membrane, external ear and ear canal normal.   Nose: Nose normal. No rhinorrhea, sinus tenderness, nasal deformity, septal deviation or nasal septal hematoma. No epistaxis.   Mouth/Throat: Uvula is midline and oropharynx is clear and moist.   Eyes: Conjunctivae, EOM and lids are normal. Pupils are equal, round, and reactive to light. Right eye exhibits no discharge. Left eye exhibits no discharge.   On reactive light accommodating, extraocular movements intact without limitation or pain.   Neck: Trachea normal. Neck supple. No tracheal tenderness present. No tracheal deviation present.   Normal range of motion.   Full passive range of motion without pain.     Cardiovascular:  Normal rate, regular rhythm, normal heart sounds, intact distal pulses and normal pulses.     Exam reveals no distant heart sounds and no friction rub.       Pulmonary/Chest: Effort normal and breath sounds normal. No respiratory distress.   Abdominal: Abdomen is soft. Bowel sounds are normal. She exhibits no distension, no pulsatile midline mass and no mass. There is no abdominal tenderness.   No right CVA tenderness.  No left CVA tenderness. There is no rebound and no guarding.   Musculoskeletal:         General: Normal range of motion.      Right  shoulder: Normal.      Left shoulder: Normal.      Right elbow: Normal.      Left elbow: Normal.      Right wrist: Normal.      Left wrist: Normal.      Right hand: Normal.      Left hand: Normal.      Cervical back: Normal, full passive range of motion without pain, normal range of motion and neck supple. No edema, erythema or rigidity. No spinous process tenderness or muscular tenderness. Normal range of motion.      Thoracic back: Normal.      Lumbar back: Normal.      Right hip: Normal.      Left hip: Normal.      Right knee: Normal.      Left knee: Normal.      Right lower leg: Normal.      Left lower leg: Normal.      Right ankle: Normal.      Left ankle: Normal.      Right foot: Normal.      Left foot: Normal.     Neurological: She is alert and oriented to person, place, and time. She has normal strength. No cranial nerve deficit or sensory deficit. GCS eye subscore is 4. GCS verbal subscore is 5. GCS motor subscore is 6.   Skin: Skin is warm and dry. Capillary refill takes less than 2 seconds. No bruising and no ecchymosis noted. No erythema.   Psychiatric: She has a normal mood and affect. Her speech is normal and behavior is normal. Thought content normal.         ED Course   Procedures  Labs Reviewed - No data to display       Imaging Results              CT Maxillofacial Without Contrast (Final result)  Result time 06/21/24 21:19:28      Final result by Sarika Cardenas MD (06/21/24 21:19:28)                   Impression:      No evidence of acute facial injury, facial fracture or sinusitis. No lytic or expansile bone lesions are identified.    Minimal partial opacification of the right mastoid air cells.      Electronically signed by: Sarika Cardenas  Date:    06/21/2024  Time:    21:19               Narrative:    EXAMINATION:  CT MAXILLOFACIAL:    CLINICAL HISTORY:  Facial trauma, blunt;    TECHNIQUE:  Contiguous axial 2 mm images followed x 1 mm reconstructions with multiplanar reformations.   Coronal and sagittal reformatted images were provided.    COMPARISON:  None.    FINDINGS:  No displaced facial fractures are identified.  The frontal sinuses are clear. The frontal ethmoidal recesses are patent. The nasal cavity is clear. The turbinates are within normal limits. The nasal septum is midline.  Mild mucoperiosteal thickening is seen in bilateral maxillary sinuses.  A small mucous retention cyst or polyp is seen in the left sphenoid sinus.  Minimal partial opacification is seen in the right mastoid air cells.  The maxilla and mandible appear intact. There is no evidence of lytic or expansile bone lesion.    Bilaterally, the optic globes and orbital contents are within normal limits. The optic lenses are normally located. Extraocular musculature and retroconal fat are within normal limits. The visualized calvarium is also intact.  Degenerative changes are seen at the right temporomandibular joints.                                       Medications   mupirocin 2 % ointment 1 Tube (1 Tube Topical (Top) Given 6/21/24 1925)   Tdap (BOOSTRIX) vaccine injection 0.5 mL (0.5 mLs Intramuscular Given 6/21/24 1943)     Medical Decision Making  58-year-old female with past medical history of hypertension presenting to the emergency department today complaining of right-sided face pain after a trip and fall.  Patient states she tripped and fell landing on the concrete causing an abrasion to the right side of her face.  Denies loss of consciousness, nausea vomiting, change in vision or weakness.  No medication taken prior to arrival.  Denies any fever, chest pain, shortness for breath, headache, dizziness or other associated symptoms.      Patient's chart and medical history reviewed.  Patient's vitals reviewed.  They are afebrile, no respiratory distress, nontoxic-appearing in the ED.  Differential diagnosis is considered as following.  - SAH, Epidural hematoma, Subdural hematoma: considered with complaint, although  unlikely with no LOC, no N/V, normal neurovascular exam  - Cervical/neck fracture: considered with complaint, although unlikely with no spinal tenderness, no step-off, no overlying skin changes, neurovascular exam intact, sensation intact, moving all UE/LE bilaterally without limitation.  - skull fracture, facial fracture: considered with complaint considered with physical exam findings, CT maxillofacial ordered for further evaluation  - Fracture/Dislocation: considered with complaint, patient was no other bony or musculoskeletal complaints.  Full range of motion of both upper and lower extremities bilaterally 5/5 strength 2+ distal pulses neurovascularly intact.  - Spinal Fx: unlikely with normal neurovascular exam, no step-off or deformity on exam, no spinal tenderness no back pain.  - Cauda Equina: unlikely with no saddle anesthesia, urinary or bowel retention or incontinence.  - internal abdominal injury/rupture/laceration: unlikely with no abdominal bruising, no abdominal TTP.   CT results with no evidence of acute osseous abnormalities.  Patient was facial abrasions were cleaned mupirocin applied.  Wound care instructions given.  Return precautions given.    At this time I'll discharge home to follow up with primary care physician in the next 1-2 days for further evaluation.  If the pain continues the pt will need to see ED for further evaluation.  The patient is comfortable with this plan and comfortable going home at this time. After taking into careful account the historical factors and physical exam findings of the patient's presentation today, in conjunction with the empirical and objective data obtained on ED workup, no acute emergent medical condition has been identified. The patient appears to be low risk for an emergent medical condition and I feel it is safe and appropriate at this time for the patient to be discharged to follow-up as detailed in their discharge instructions for reevaluation and  possible continued outpatient workup and management. I have discussed the specifics of the workup with the patient and the patient has verbalized understanding of the details of the workup, the diagnosis, the treatment plan, and the need for outpatient follow-up.  Although the patient has no emergent etiology today this does not preclude the development of an emergent condition so in addition, I have advised the patient that they can return to the ED and/or activate EMS at any time with worsening of their symptoms, change of their symptoms, or with any other medical complaint.  The patient remained comfortable and stable during their visit in the ED.  Discharge and follow-up instructions discussed with the patient who expressed understanding and willingness to comply with my recommendations. I discussed with the patient/family the diagnosis, treatment plan, indications for return to the emergency department, and for expected follow-up. Please follow up with your primary doctor in 1-2 days and return to the ED in any new, worsening, or continued symptoms. The patient/family verbalized an understanding. The patient/family is asked if there are any questions or concerns. We discuss the case, until all issues are addressed to the patient/family's satisfaction. Patient/family understands and is agreeable to the plan.  LISA SELBY PA-C    DISCLAIMER: This note was prepared with [employer] voice recognition transcription software. Garbled syntax, mangled pronouns, and other bizarre constructions may be attributed to that software system.          Amount and/or Complexity of Data Reviewed  Radiology: ordered.    Risk  OTC drugs.  Prescription drug management.                                      Clinical Impression:  Final diagnoses:  [S00.81XA] Facial abrasion, initial encounter (Primary)  [W19.XXXA] Fall, initial encounter  [S09.90XA] Injury of head, initial encounter          ED Disposition Condition    Discharge Stable           ED Prescriptions       Medication Sig Dispense Start Date End Date Auth. Provider    methocarbamoL (ROBAXIN) 500 MG Tab Take 2 tablets (1,000 mg total) by mouth 3 (three) times daily. for 5 days 30 tablet 6/21/2024 6/26/2024 Te Tillman PA-C    acetaminophen (TYLENOL) 500 MG tablet Take 1 tablet (500 mg total) by mouth every 6 (six) hours as needed for Pain. 30 tablet 6/21/2024 -- Te Tillman PA-C          Follow-up Information       Follow up With Specialties Details Why Contact Info    Muriel Murray MD Internal Medicine Schedule an appointment as soon as possible for a visit in 1 day for follow up [address]  [address]  [place] [address]  [phone]               Te Tillman PA-C  06/21/24 [id number]

## 2024-06-22 NOTE — PROGRESS NOTES
Subjective:      Patient ID: Atiya Magallanes is a 58 y.o. female.    Chief Complaint: Abrasion    Patient's place of employment - [employer]  Patient's job title -   Date of injury - 06/21/24  Body part injured including left or right - RIGHT SIDE SHOULDER, RIGHT KNEE, RIGHT SIDE OF FACE  Injury Mechanism - FALL  What they were doing when they got hurt - RETRIEVING MAIL   What they did immediately after - GOT UP AND CALLED SUP  Pain scale right now - 2      PT WAS SEEN IN THE ER YESTERDAY FOR FACIAL INJURY. PT STATES NOW HAVING RIGHT SHOULDER AND RIGHT KNEE PAIN.    Patient states that she fell at work, hitting her right side of her face, right shoulder and right knee. She was seen in the ER for her facial injury but states that she is now having right shoulder and knee pain. She denies fever, chills, numbness, tingling, headache, vision changes, facial drooping, slurred speech, cp, sob, nausea, vomiting, inability to bear weight, joint swelling, joint locking.    Shoulder Injury   The incident occurred at work. The right shoulder is affected. The incident occurred 12 to 24 hours ago. The injury mechanism was a fall. The quality of the pain is described as aching. The pain does not radiate. Pertinent negatives include no chest pain, muscle weakness, numbness or tingling. Nothing aggravates the symptoms. She has tried nothing for the symptoms. The treatment provided no relief.   Knee Pain   The incident occurred 12 to 24 hours ago. The incident occurred at work. The injury mechanism was an eversion injury. The pain is present in the right knee. The pain has been Constant since onset. Pertinent negatives include no inability to bear weight, loss of motion, loss of sensation, muscle weakness, numbness or tingling. She reports no foreign bodies present. Nothing aggravates the symptoms. She has tried nothing for the symptoms. The treatment provided no relief.       Constitution: Negative for chills and fever.   HENT:   Positive for facial trauma.    Cardiovascular:  Negative for chest pain and sob on exertion.   Respiratory:  Negative for shortness of breath.    Gastrointestinal:  Negative for abdominal pain, nausea, vomiting and diarrhea.   Musculoskeletal:  Positive for pain, trauma and joint pain (right shoulder and knee). Negative for joint swelling.   Skin:  Positive for abrasion.   Neurological:  Negative for dizziness, light-headedness, passing out, facial drooping, speech difficulty, headaches, numbness and tingling.     Objective:     Physical Exam  Vitals and nursing note reviewed.   Constitutional:       General: She is not in acute distress.     Appearance: Normal appearance. She is normal weight. She is not ill-appearing, toxic-appearing or diaphoretic.   HENT:      Head:        Comments: There are superficial abrasions appreciated on the right side of the face. No erythema, no area of fluctuance, no area of induration, no discharge, no warmth appreciated on exam.  Eyes:      General: No visual field deficit.  Musculoskeletal:      Right shoulder: Tenderness present. No swelling, deformity, effusion, laceration, bony tenderness or crepitus. Normal range of motion. Normal strength. Normal pulse.        Arms:       Right knee: No swelling, deformity, effusion, erythema, ecchymosis, lacerations, bony tenderness or crepitus. Normal range of motion. Tenderness present. No LCL laxity, MCL laxity, ACL laxity or PCL laxity. Normal alignment, normal meniscus and normal patellar mobility.        Legs:       Comments: 5/5 strength of right shoulder and knee. Full ROM, active and passive of right shoulder and knee. Sensation intact. Distal/radial pulses, 2+ bilaterally. Patient is able to ambulate in exam room and throughout clinic. No erythema, no area of fluctuance, no area of induration, no discharge, no warmth appreciated on exam. There are small superficial abrasions appreciate on the right shoulder.   Neurological:       General: No focal deficit present.      Mental Status: She is alert and oriented to person, place, and time. Mental status is at baseline.      Cranial Nerves: No cranial nerve deficit, dysarthria or facial asymmetry.      Sensory: Sensation is intact. No sensory deficit.      Motor: No weakness, tremor, atrophy, abnormal muscle tone, seizure activity or pronator drift.      Coordination: Coordination is intact. Romberg sign negative. Coordination normal. Finger-Nose-Finger Test normal.      Gait: Gait is intact. Gait normal.      Deep Tendon Reflexes: Reflexes normal.      Comments: Great  strength.    CN1: patient is able to smell  CN2: vision intact  CN3: patient able to move eyes and blink  CN4: EOMI  CN5: sensation and movement of V1-V3 intact  CN6: EOMI  CN7: patient able to exhibit facial expressions  CN8: hearing and balance intact, negative romberg exam  CN9: gag reflex intact  CN10: Heart rate WNL  CN11: shoulder and neck movement/strength intact  CN12: tongue mobility appreciated       X-Ray Knee 3 View Right    Result Date: 6/22/2024  EXAMINATION: XR KNEE 3 VIEW RIGHT CLINICAL HISTORY: Unspecified fall, initial encounter TECHNIQUE: AP, lateral, and Merchant views of the right knee were performed. COMPARISON: None FINDINGS: Three views right knee. There is bilateral medial compartmental narrowing, right greater than left.  There is osteopenia.  No acute displaced fracture or dislocation of the right knee.  No large knee joint effusion.  No radiopaque foreign body.     1. No acute displaced fracture or dislocation of the right knee. Electronically signed by: Ashvin Leblanc MD Date:    06/22/2024 Time:    15:29    X-Ray Shoulder Trauma 3 view Right    Result Date: 6/22/2024  EXAMINATION: XR SHOULDER TRAUMA 3 VIEW RIGHT CLINICAL HISTORY: Unspecified fall, initial encounter TECHNIQUE: Three or four views of the right shoulder were performed. COMPARISON: None FINDINGS: The bone mineralization is within  normal limits.  There is no cortical step-off.  There is no evidence of periostitis. The glenohumeral articulation is maintained.  The acromioclavicular joint is within normal limits.  The coracoclavicular interval is unremarkable. The visualized right hemithorax is within normal limits.  There is no evidence of a pneumothorax or pulmonary contusion. There is no evidence of a fracture or dislocation.     No acute process. Electronically signed by: Chadwick Jalloh MD Date:    06/22/2024 Time:    15:24    CT Maxillofacial Without Contrast    Result Date: 6/21/2024  EXAMINATION: CT MAXILLOFACIAL: CLINICAL HISTORY: Facial trauma, blunt; TECHNIQUE: Contiguous axial 2 mm images followed x 1 mm reconstructions with multiplanar reformations.  Coronal and sagittal reformatted images were provided. COMPARISON: None. FINDINGS: No displaced facial fractures are identified.  The frontal sinuses are clear. The frontal ethmoidal recesses are patent. The nasal cavity is clear. The turbinates are within normal limits. The nasal septum is midline.  Mild mucoperiosteal thickening is seen in bilateral maxillary sinuses.  A small mucous retention cyst or polyp is seen in the left sphenoid sinus.  Minimal partial opacification is seen in the right mastoid air cells.  The maxilla and mandible appear intact. There is no evidence of lytic or expansile bone lesion. Bilaterally, the optic globes and orbital contents are within normal limits. The optic lenses are normally located. Extraocular musculature and retroconal fat are within normal limits. The visualized calvarium is also intact.  Degenerative changes are seen at the right temporomandibular joints.     No evidence of acute facial injury, facial fracture or sinusitis. No lytic or expansile bone lesions are identified. Minimal partial opacification of the right mastoid air cells. Electronically signed by: Sarika Cardenas Date:    06/21/2024 Time:    21:19    Assessment:      1. Fall,  initial encounter    2. Sprain of right shoulder, unspecified shoulder sprain type, initial encounter    3. Sprain of right knee, unspecified ligament, initial encounter      Plan:   Previous notes reviewed.  Vital signs reviewed.  Labs ordered. Labs reviewed.  Patient is neurovascularly intact, no neurological deficits appreciated on exam.  Discussed shoulder sprain, knee sprain, home care, tx options, and given follow up precautions.  Patient was briefed on my thought process and diagnosis.   Patient involved with the treatment plan and agreed to the plan.  Patient informed on warning signs, patient understood warning signs and to go to urgent care or ER if warning signs appear.  Please excuse grammatical/spelling errors appreciated throughout this visit encounter for a remote dictation device was used during this encounter.              No follow-ups on file.      Johnny Baxter PA-C

## 2024-06-22 NOTE — ED TRIAGE NOTES
Pt presents to ED for the evaluation of lac post fall . Pt states she tripped and fell on a cement floor hitting her arms and right side of her face, denies LOC, pt not on blood thinners. Pt has a hx of HTN

## 2024-06-24 ENCOUNTER — OFFICE VISIT (OUTPATIENT)
Dept: URGENT CARE | Facility: CLINIC | Age: 59
End: 2024-06-24
Payer: OTHER GOVERNMENT

## 2024-06-24 VITALS
DIASTOLIC BLOOD PRESSURE: 80 MMHG | OXYGEN SATURATION: 97 % | WEIGHT: 192 LBS | HEART RATE: 46 BPM | TEMPERATURE: 98 F | RESPIRATION RATE: 14 BRPM | SYSTOLIC BLOOD PRESSURE: 154 MMHG | BODY MASS INDEX: 30.13 KG/M2 | HEIGHT: 67 IN

## 2024-06-24 DIAGNOSIS — S46.911A RIGHT SHOULDER STRAIN, INITIAL ENCOUNTER: ICD-10-CM

## 2024-06-24 DIAGNOSIS — S00.83XA FACIAL CONTUSION, INITIAL ENCOUNTER: ICD-10-CM

## 2024-06-24 DIAGNOSIS — S80.01XA CONTUSION OF RIGHT KNEE, INITIAL ENCOUNTER: ICD-10-CM

## 2024-06-24 DIAGNOSIS — S00.81XA FACIAL ABRASION, INITIAL ENCOUNTER: ICD-10-CM
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
## 2024-06-24 PROCEDURE — 99204 OFFICE O/P NEW MOD 45 MIN: CPT | Mod: S$GLB,,, | Performed by: EMERGENCY MEDICINE

## 2024-06-24 NOTE — LETTER
[facility]  [address], [address]  [place] [address]  Phone: [phone]  Fax: [phone]  [facility] [facility]: [email or web address]    Pt Name: Atiya Magallanes  Injury Date: 06/21/2024   Employee ID: [id number] Date of First Treatment: 06/24/2024   Company: [id number]      Appointment Time: 11:30 am Arrived: 11:29 am   Provider: David Todd MD Time Out: 12:15 pm     Office Treatment:   1. Fall, initial encounter    2. Contusion of right knee, initial encounter    3. Right shoulder strain, initial encounter    4. Facial contusion, initial encounter    5. Facial abrasion, initial encounter          Patient Instructions: Keep dressing clean/dry/covered, Attention not to aggravate affected area (NEOSPORIN TO THE FACIAL ABRASIONS)    Restrictions: Regular Duty, Discharged from Occupational Health (OK TO RETURN TO WORK 7/1/2024 MONDAY WITHOUT RESTRICTIONS)     Return Appointment: None.  Follow up if symptoms worsen or fail to improve.   PRIMITIVO

## 2024-06-24 NOTE — PROGRESS NOTES
Subjective:      Patient ID: Atiya Magallanes is a 58 y.o. female.    Chief Complaint: Facial Injury (DOI:06/21/24--Face, both knees, and right shoulder/SW)    Patient's place of employment - [employer]  Patient's job title -   Date of injury - 06/21/24  Body part injured including left or right - RIGHT SIDE SHOULDER, RIGHT KNEE, RIGHT SIDE OF FACE  Injury Mechanism - FALL  What they were doing when they got hurt - RETRIEVING MAIL   What they did immediately after - GOT UP AND CALLED SUP  Pain scale right now - 2/10  SW    PATIENT IS A 58-YEAR-OLD FEMALE  WHO WAS RETRIEVING MAIL FROM THE BIN, AND FELL FORWARD HITTING HER RIGHT KNEE AND RIGHT SHOULDER AS WELL AS HITTING HER FACE ON THE GROUND.  SHE SUSTAINED ABRASIONS AND SWELLING TO THE RIGHT CHEEK AND PERIORBITAL REGION AND WAS HAVING SIGNIFICANT PAIN SO WENT TO THE EMERGENCY DEPARTMENT.  SHE WAS EVALUATED AND HAD X-RAYS OF THE RIGHT KNEE AND SHOULDER WHICH WERE NEGATIVE AS WELL AS A CT SCAN OF THE MAXILLOFACIAL BONES WHICH WAS NEGATIVE.  SHE REPORTS SOFT TISSUE SWELLING OF THE FACE HOWEVER NO SIGNIFICANT PAIN.  NO LOSS OF CONSCIOUSNESS NO WEAKNESS IN THE ARMS OR LEGS.  HER RIGHT SHOULDER AND RIGHT KNEE OF IMPROVED SIGNIFICANTLY AND ESSENTIALLY BACK TO NORMAL.  GIVEN TIME FOR REST, ICE, ANTI-INFLAMMATORY AND WOUND CARE OF THE FACE WHICH BE A HINDRANCE AT THE DESK AS A  TIME TO HEAL AND RETURN TO WORK REGULAR DUTY WITHOUT RESTRICTIONS ON MONDAY 07/01/2024.  She did receive a tetanus shot for her abrasions as well as imaging including x-rays and CT scan of the face which were negative.    Facial Injury   The incident occurred 2 days ago. The injury mechanism was a fall. Pertinent negatives include no vomiting.       ROS  Constitution: Negative for chills, fatigue and fever.   HENT:  Positive for facial trauma. Negative for ear pain, sinus pain and sore throat.    Neck: Negative for neck pain and neck stiffness.   Cardiovascular:  Negative for chest pain,  palpitations and sob on exertion.   Eyes:  Negative for eye pain and vision loss.   Respiratory:  Negative for cough, shortness of breath and asthma.    Gastrointestinal:  Negative for abdominal pain, nausea, vomiting and diarrhea.   Genitourinary:  Negative for dysuria, frequency and hematuria.   Musculoskeletal:  Positive for pain and trauma. Negative for abnormal ROM of joint and back pain.   Skin:  Positive for abrasion and bruising. Negative for rash and wound.   Allergic/Immunologic: Negative for seasonal allergies and asthma.   Neurological:  Negative for dizziness, light-headedness and altered mental status.   Psychiatric/Behavioral:  Negative for altered mental status and confusion.      Objective:     Physical Exam  Vitals and nursing note reviewed.   Constitutional:       General: She is not in acute distress.     Appearance: Normal appearance. She is well-developed and normal weight. She is not ill-appearing, toxic-appearing or diaphoretic.   HENT:      Head: Normocephalic and atraumatic.      Jaw: No trismus.        Comments: There are superficial abrasions and soft tissue swelling with mild ecchymosis to the right cheek and lateral periorbital region consistent with contusion appreciated on the right side of the face. No erythema, no area of fluctuance, no area of induration, no discharge, no warmth appreciated on exam.     Right Ear: Hearing, tympanic membrane, ear canal and external ear normal.      Left Ear: Hearing, tympanic membrane, ear canal and external ear normal.      Nose: Nose normal. No nasal deformity, mucosal edema or rhinorrhea.      Right Sinus: No maxillary sinus tenderness or frontal sinus tenderness.      Left Sinus: No maxillary sinus tenderness or frontal sinus tenderness.      Mouth/Throat:      Dentition: Normal dentition.      Pharynx: Uvula midline. No posterior oropharyngeal erythema or uvula swelling.   Eyes:      General: Lids are normal. No visual field deficit or scleral  icterus.        Right eye: No discharge.         Left eye: No discharge.      Conjunctiva/sclera: Conjunctivae normal.      Comments: Sclera clear bilat   Neck:      Trachea: Trachea and phonation normal.   Cardiovascular:      Rate and Rhythm: Normal rate and regular rhythm.      Pulses: Normal pulses.      Heart sounds: Normal heart sounds.   Pulmonary:      Effort: Pulmonary effort is normal. No respiratory distress.      Breath sounds: Normal breath sounds.   Abdominal:      General: Bowel sounds are normal. There is no distension.      Palpations: Abdomen is soft. There is no mass or pulsatile mass.      Tenderness: There is no abdominal tenderness.   Musculoskeletal:         General: No deformity. Normal range of motion.      Right shoulder: Tenderness present. No swelling, deformity, effusion, laceration, bony tenderness or crepitus. Normal range of motion. Normal strength. Normal pulse.        Arms:       Cervical back: Full passive range of motion without pain, normal range of motion and neck supple.      Right knee: No swelling, deformity, effusion, erythema, ecchymosis, lacerations, bony tenderness or crepitus. Normal range of motion. Tenderness present. No LCL laxity, MCL laxity, ACL laxity or PCL laxity. Normal alignment, normal meniscus and normal patellar mobility.        Legs:       Comments: EXAMINATION OF THE RIGHT SHOULDER SHOWS HEALING ABRASIONS WITH NORMAL RANGE OF MOTION AND NO TENDERNESS TO PALPATION.  NO PAIN ON INTERNAL OR EXTERNAL ROTATION, DISTALLY NEUROVASCULARLY INTACT.      EXAMINATION OF THE RIGHT KNEE SHOWS NORMAL RANGE OF MOTION AND DISTALLY NEUROVASCULARLY INTACT, WITH NO SWELLING OR ECCHYMOSIS NOTED.  NORMAL GAIT   Skin:     General: Skin is warm and dry.      Coloration: Skin is not pale.   Neurological:      General: No focal deficit present.      Mental Status: She is alert and oriented to person, place, and time. Mental status is at baseline.      Cranial Nerves: No cranial  nerve deficit, dysarthria or facial asymmetry.      Sensory: Sensation is intact. No sensory deficit.      Motor: No weakness, tremor, atrophy, abnormal muscle tone, seizure activity or pronator drift.      Coordination: Coordination is intact. Romberg sign negative. Coordination normal. Finger-Nose-Finger Test normal.      Gait: Gait is intact. Gait normal.      Deep Tendon Reflexes: Reflexes normal.      Comments: Great  strength.    CN1: patient is able to smell  CN2: vision intact  CN3: patient able to move eyes and blink  CN4: EOMI  CN5: sensation and movement of V1-V3 intact  CN6: EOMI  CN7: patient able to exhibit facial expressions  CN8: hearing and balance intact, negative romberg exam  CN9: gag reflex intact  CN10: Heart rate WNL  CN11: shoulder and neck movement/strength intact  CN12: tongue mobility appreciated   Psychiatric:         Speech: Speech normal.         Behavior: Behavior normal. Behavior is cooperative.         Thought Content: Thought content normal.         Judgment: Judgment normal.       X-Ray Knee 3 View Right    Result Date: 6/22/2024  EXAMINATION: XR KNEE 3 VIEW RIGHT CLINICAL HISTORY: Unspecified fall, initial encounter TECHNIQUE: AP, lateral, and Merchant views of the right knee were performed. COMPARISON: None FINDINGS: Three views right knee. There is bilateral medial compartmental narrowing, right greater than left.  There is osteopenia.  No acute displaced fracture or dislocation of the right knee.  No large knee joint effusion.  No radiopaque foreign body.     1. No acute displaced fracture or dislocation of the right knee. Electronically signed by: Ashvin Leblanc MD Date:    06/22/2024 Time:    15:29    X-Ray Shoulder Trauma 3 view Right    Result Date: 6/22/2024  EXAMINATION: XR SHOULDER TRAUMA 3 VIEW RIGHT CLINICAL HISTORY: Unspecified fall, initial encounter TECHNIQUE: Three or four views of the right shoulder were performed. COMPARISON: None FINDINGS: The bone  mineralization is within normal limits.  There is no cortical step-off.  There is no evidence of periostitis. The glenohumeral articulation is maintained.  The acromioclavicular joint is within normal limits.  The coracoclavicular interval is unremarkable. The visualized right hemithorax is within normal limits.  There is no evidence of a pneumothorax or pulmonary contusion. There is no evidence of a fracture or dislocation.     No acute process. Electronically signed by: Chadwick Jalloh MD Date:    06/22/2024 Time:    15:24    CT Maxillofacial Without Contrast    Result Date: 6/21/2024  EXAMINATION: CT MAXILLOFACIAL: CLINICAL HISTORY: Facial trauma, blunt; TECHNIQUE: Contiguous axial 2 mm images followed x 1 mm reconstructions with multiplanar reformations.  Coronal and sagittal reformatted images were provided. COMPARISON: None. FINDINGS: No displaced facial fractures are identified.  The frontal sinuses are clear. The frontal ethmoidal recesses are patent. The nasal cavity is clear. The turbinates are within normal limits. The nasal septum is midline.  Mild mucoperiosteal thickening is seen in bilateral maxillary sinuses.  A small mucous retention cyst or polyp is seen in the left sphenoid sinus.  Minimal partial opacification is seen in the right mastoid air cells.  The maxilla and mandible appear intact. There is no evidence of lytic or expansile bone lesion. Bilaterally, the optic globes and orbital contents are within normal limits. The optic lenses are normally located. Extraocular musculature and retroconal fat are within normal limits. The visualized calvarium is also intact.  Degenerative changes are seen at the right temporomandibular joints.     No evidence of acute facial injury, facial fracture or sinusitis. No lytic or expansile bone lesions are identified. Minimal partial opacification of the right mastoid air cells. Electronically signed by: Sarika Cardenas Date:    06/21/2024  Time:    21:19        Assessment:      1. Fall, initial encounter    2. Contusion of right knee, initial encounter    3. Right shoulder strain, initial encounter    4. Facial contusion, initial encounter    5. Facial abrasion, initial encounter      Plan:     PATIENT IS A 58-YEAR-OLD FEMALE  WHO WAS RETRIEVING MAIL FROM THE BIN, AND FELL FORWARD HITTING HER RIGHT KNEE AND RIGHT SHOULDER AS WELL AS HITTING HER FACE ON THE GROUND.  SHE SUSTAINED ABRASIONS AND SWELLING TO THE RIGHT CHEEK AND PERIORBITAL REGION AND WAS HAVING SIGNIFICANT PAIN SO WENT TO THE EMERGENCY DEPARTMENT.  SHE WAS EVALUATED AND HAD X-RAYS OF THE RIGHT KNEE AND SHOULDER WHICH WERE NEGATIVE AS WELL AS A CT SCAN OF THE MAXILLOFACIAL BONES WHICH WAS NEGATIVE.  SHE REPORTS SOFT TISSUE SWELLING OF THE FACE HOWEVER NO SIGNIFICANT PAIN.  NO LOSS OF CONSCIOUSNESS NO WEAKNESS IN THE ARMS OR LEGS.  HER RIGHT SHOULDER AND RIGHT KNEE OF IMPROVED SIGNIFICANTLY AND ESSENTIALLY BACK TO NORMAL.  GIVEN TIME FOR REST, ICE, ANTI-INFLAMMATORY AND WOUND CARE OF THE FACE WHICH BE A HINDRANCE AT THE DESK AS A  TIME TO HEAL AND RETURN TO WORK REGULAR DUTY WITHOUT RESTRICTIONS ON MONDAY 07/01/2024.  She did receive a tetanus shot for her abrasions as well as imaging including x-rays and CT scan of the face which were negative.     Patient Instructions: Keep dressing clean/dry/covered, Attention not to aggravate affected area (NEOSPORIN TO THE FACIAL ABRASIONS)   Restrictions: Regular Duty, Discharged from Occupational Health (OK TO RETURN TO WORK 7/1/2024 MONDAY WITHOUT RESTRICTIONS)  Follow up if symptoms worsen or fail to improve.